# Patient Record
Sex: FEMALE | Race: WHITE | Employment: OTHER | URBAN - METROPOLITAN AREA
[De-identification: names, ages, dates, MRNs, and addresses within clinical notes are randomized per-mention and may not be internally consistent; named-entity substitution may affect disease eponyms.]

---

## 2023-02-15 ENCOUNTER — OFFICE VISIT (OUTPATIENT)
Dept: PRIMARY CARE CLINIC | Age: 43
End: 2023-02-15
Payer: OTHER GOVERNMENT

## 2023-02-15 VITALS
HEIGHT: 65 IN | WEIGHT: 177 LBS | SYSTOLIC BLOOD PRESSURE: 140 MMHG | OXYGEN SATURATION: 98 % | BODY MASS INDEX: 29.49 KG/M2 | TEMPERATURE: 97.5 F | RESPIRATION RATE: 16 BRPM | HEART RATE: 85 BPM | DIASTOLIC BLOOD PRESSURE: 99 MMHG

## 2023-02-15 DIAGNOSIS — Z29.9 PREVENTIVE MEASURE: ICD-10-CM

## 2023-02-15 DIAGNOSIS — Z12.31 ENCOUNTER FOR SCREENING MAMMOGRAM FOR MALIGNANT NEOPLASM OF BREAST: ICD-10-CM

## 2023-02-15 DIAGNOSIS — F32.A DEPRESSION, UNSPECIFIED DEPRESSION TYPE: ICD-10-CM

## 2023-02-15 DIAGNOSIS — R03.0 ELEVATED BP WITHOUT DIAGNOSIS OF HYPERTENSION: ICD-10-CM

## 2023-02-15 DIAGNOSIS — F41.9 ANXIETY: Primary | ICD-10-CM

## 2023-02-15 DIAGNOSIS — M54.9 CHRONIC BACK PAIN, UNSPECIFIED BACK LOCATION, UNSPECIFIED BACK PAIN LATERALITY: ICD-10-CM

## 2023-02-15 DIAGNOSIS — G89.29 CHRONIC BACK PAIN, UNSPECIFIED BACK LOCATION, UNSPECIFIED BACK PAIN LATERALITY: ICD-10-CM

## 2023-02-15 DIAGNOSIS — E78.00 PURE HYPERCHOLESTEROLEMIA: ICD-10-CM

## 2023-02-15 DIAGNOSIS — R41.840 INATTENTION: ICD-10-CM

## 2023-02-15 PROCEDURE — 99204 OFFICE O/P NEW MOD 45 MIN: CPT | Performed by: FAMILY MEDICINE

## 2023-02-15 RX ORDER — ALPRAZOLAM 0.5 MG/1
TABLET ORAL
COMMUNITY

## 2023-02-15 RX ORDER — IBUPROFEN 200 MG
TABLET ORAL
COMMUNITY

## 2023-02-15 RX ORDER — SERTRALINE HYDROCHLORIDE 50 MG/1
TABLET, FILM COATED ORAL
COMMUNITY

## 2023-02-15 NOTE — PROGRESS NOTES
Chief Complaint   Patient presents with    Establish Care      Identified pt with two pt identifiers(name and ). Chief Complaint   Patient presents with    Establish Care        No flowsheet data found. There were no vitals filed for this visit. Health Maintenance Due   Topic Date Due    Hepatitis C Screening  Never done    Depression Screen  Never done    DTaP/Tdap/Td series (1 - Tdap) Never done    Cervical cancer screen  Never done    Lipid Screen  Never done    COVID-19 Vaccine (3 - Booster) 2021    Flu Vaccine (1) Never done        1. Have you been to the ER, urgent care clinic since your last visit? Hospitalized since your last visit? No    2. Have you seen or consulted any other health care providers outside of the [address] [place] [address] since your last visit? Include any pap smears or colon screening. No    3. For patients aged 39-70: Has the patient had a colonoscopy / FIT/ Cologuard? NA - based on age      If the patient is female:    4. For patients aged 41-77: Has the patient had a mammogram within the past 2 years? No      5. For patients aged 21-65: Has the patient had a pap smear?  No

## 2023-02-15 NOTE — PROGRESS NOTES
HPI     Chief Complaint   Patient presents with    [facility] [place] [facility]     She is a 44 y.o. female who presents for [facility]. Depression/ Anxiety - She is on Zoloft and Xanax. States she had a hard time with her psychiatrists. States she was checking in with her PCP every 6 months. States she was on 100mg of Zoloft but had to drop down to 75mg daily. States she went off off the Zoloft completely and then winter came and she went back on to 25mg around [place] time. Has been on 25mg daily since and doing good. She takes Xanax PRN. She was previously seeing a counselor/ therapist but felt it wasn't good for her. Denies SI/ HI. Back Pain - She does have chronic back pain from arthritis throughout her back. She states she smokes marijuana for the pain. NO numbness, saddle anesthesia, urinary incontinence. She had imaging of her back 4 years ago. They told her she arthritis when she was 29 YO. She has tried PT in the past but states that made the pain worse because she states she had bulging discs. She went to a chiropractor in the past but states this doesn't fix the problem. She has not seen Ortho or pain management. States she wants to get back in shape. States she feels a little nervous coming in today and feels her BP is high because of anxiety. She does have a hx of HLD. She is not on medication for this. She lost weight before which helped but she gained the weight back. She has not had blood work in about a year. Would like order for mammogram and referral to GYN for PAP. She has MTHFR. Review of Systems   Genitourinary:  Negative for difficulty urinating. Musculoskeletal:  Positive for back pain. Neurological:  Negative for weakness and numbness. Reviewed PmHx, RxHx, FmHx, SocHx, AllgHx and updated and dated in the chart.     Physical Exam:  Visit Vitals  BP (!) 140/99   Pulse 85   Temp 97.5 °F (36.4 °C) (Temporal)   Resp 16   Ht 5' 5\" (1.651 m)   Wt 177 lb (80.3 kg) SpO2 98%   BMI 29.45 kg/m²     Physical Exam  Vitals and nursing note reviewed. Constitutional:       General: She is not in acute distress. Appearance: Normal appearance. She is not ill-appearing. Cardiovascular:      Rate and Rhythm: Normal rate and regular rhythm. Heart sounds: No murmur heard. Pulmonary:      Effort: Pulmonary effort is normal. No respiratory distress. Breath sounds: Normal breath sounds. No wheezing, rhonchi or rales. Musculoskeletal:      Comments: Full ROM of neck. Strength intact in UE/ LE BL. Sensation intact. SLR neg BL. Patellar reflexes 2+ BL. Gait is normal.    Neurological:      Mental Status: She is alert. No results found for this or any previous visit (from the past 12 hour(s)). Assessment / Plan     Diagnoses and all orders for this visit:    1. Anxiety - Discussed I do not fill long term Xanax and if using marijuana daily would need to see Psychiatry.   -     REFERRAL TO PSYCHIATRY    2. Inattention   -     REFERRAL TO PSYCHIATRY    3. Depression, unspecified depression type - Currently off her medication. She is not interested in counseling.   -     REFERRAL TO PSYCHIATRY    4. Elevated BP without diagnosis of hypertension - Check labs. She thinks this is 2/2 anxiety. Recommend she monitor at home and if persistently high follow up in office.   -     CBC WITH AUTOMATED DIFF; Future  -     METABOLIC PANEL, COMPREHENSIVE; Future  -     TSH 3RD GENERATION; Future    5. HLD - Check lipid panel. Not currently on medication.   -     LIPID PANEL; Future    6. Encounter for screening mammogram for malignant neoplasm of breast  -     THERESA MAMMO BI SCREENING INCL CAD; Future    7. Preventive measure  -     REFERRAL TO OBSTETRICS AND GYNECOLOGY    8. Chronic Back Pain - Not interested in repeat imaging or seeing specialist. States she wants to focus on getting into better shape. Given home exercises.       I have discussed the diagnosis with the patient and the intended plan as seen in the above orders. The patient has received an after-visit summary and questions were answered concerning future plans. I have discussed medication side effects and warnings with the patient as well.     Mary Cavazos, DO